# Patient Record
Sex: FEMALE | Race: BLACK OR AFRICAN AMERICAN
[De-identification: names, ages, dates, MRNs, and addresses within clinical notes are randomized per-mention and may not be internally consistent; named-entity substitution may affect disease eponyms.]

---

## 2017-05-04 ENCOUNTER — HOSPITAL ENCOUNTER (EMERGENCY)
Dept: HOSPITAL 17 - NEPD | Age: 27
Discharge: HOME | End: 2017-05-04
Payer: COMMERCIAL

## 2017-05-04 VITALS
DIASTOLIC BLOOD PRESSURE: 85 MMHG | HEART RATE: 98 BPM | OXYGEN SATURATION: 97 % | SYSTOLIC BLOOD PRESSURE: 142 MMHG | RESPIRATION RATE: 14 BRPM | TEMPERATURE: 98.7 F

## 2017-05-04 VITALS — BODY MASS INDEX: 24.99 KG/M2 | HEIGHT: 58 IN | WEIGHT: 119.05 LBS

## 2017-05-04 DIAGNOSIS — N76.0: Primary | ICD-10-CM

## 2017-05-04 DIAGNOSIS — N30.00: ICD-10-CM

## 2017-05-04 DIAGNOSIS — B96.89: ICD-10-CM

## 2017-05-04 LAB
ALP SERPL-CCNC: 66 U/L (ref 45–117)
ALT SERPL-CCNC: 15 U/L (ref 10–53)
ANION GAP SERPL CALC-SCNC: 8 MEQ/L (ref 5–15)
AST SERPL-CCNC: 13 U/L (ref 15–37)
BACTERIA #/AREA URNS HPF: (no result) /HPF
BASOPHILS # BLD AUTO: 0 TH/MM3 (ref 0–0.2)
BASOPHILS NFR BLD: 0.3 % (ref 0–2)
BETA HCG QUANT: 939 MIU/ML (ref 0–5)
BILIRUB SERPL-MCNC: 0.3 MG/DL (ref 0.2–1)
BUN SERPL-MCNC: 11 MG/DL (ref 7–18)
CHLAMYDIA PCR: NOT DETECTED
CHLORIDE SERPL-SCNC: 105 MEQ/L (ref 98–107)
COLOR UR: YELLOW
COMMENT (UR): (no result)
CULTURE IF INDICATED: (no result)
EOSINOPHIL # BLD: 0.1 TH/MM3 (ref 0–0.4)
EOSINOPHIL NFR BLD: 1.1 % (ref 0–4)
ERYTHROCYTE [DISTWIDTH] IN BLOOD BY AUTOMATED COUNT: 14 % (ref 11.6–17.2)
GFR SERPLBLD BASED ON 1.73 SQ M-ARVRAT: 113 ML/MIN (ref 89–?)
GLUCOSE UR STRIP-MCNC: (no result) MG/DL
HCO3 BLD-SCNC: 24.9 MEQ/L (ref 21–32)
HCT VFR BLD CALC: 34.7 % (ref 35–46)
HEMO FLAGS: (no result)
HGB UR QL STRIP: (no result)
KETONES UR STRIP-MCNC: (no result) MG/DL
LYMPHOCYTES # BLD AUTO: 1.7 TH/MM3 (ref 1–4.8)
LYMPHOCYTES NFR BLD AUTO: 25.6 % (ref 9–44)
MCH RBC QN AUTO: 28.5 PG (ref 27–34)
MCHC RBC AUTO-ENTMCNC: 32.6 % (ref 32–36)
MCV RBC AUTO: 87.2 FL (ref 80–100)
MONOCYTES NFR BLD: 7.1 % (ref 0–8)
MUCOUS THREADS #/AREA URNS LPF: (no result) /LPF
NEISSERIA PCR: NOT DETECTED
NEUTROPHILS # BLD AUTO: 4.5 TH/MM3 (ref 1.8–7.7)
NEUTROPHILS NFR BLD AUTO: 65.9 % (ref 16–70)
NITRITE UR QL STRIP: (no result)
PLATELET # BLD: 292 TH/MM3 (ref 150–450)
POTASSIUM SERPL-SCNC: 3.8 MEQ/L (ref 3.5–5.1)
RBC # BLD AUTO: 3.98 MIL/MM3 (ref 4–5.3)
SODIUM SERPL-SCNC: 138 MEQ/L (ref 136–145)
SP GR UR STRIP: 1.03 (ref 1–1.03)
SQUAMOUS #/AREA URNS HPF: 2 /HPF (ref 0–5)
WBC # BLD AUTO: 6.8 TH/MM3 (ref 4–11)

## 2017-05-04 PROCEDURE — 87086 URINE CULTURE/COLONY COUNT: CPT

## 2017-05-04 PROCEDURE — 87210 SMEAR WET MOUNT SALINE/INK: CPT

## 2017-05-04 PROCEDURE — 84702 CHORIONIC GONADOTROPIN TEST: CPT

## 2017-05-04 PROCEDURE — 76817 TRANSVAGINAL US OBSTETRIC: CPT

## 2017-05-04 PROCEDURE — 87491 CHLMYD TRACH DNA AMP PROBE: CPT

## 2017-05-04 PROCEDURE — 96361 HYDRATE IV INFUSION ADD-ON: CPT

## 2017-05-04 PROCEDURE — 81001 URINALYSIS AUTO W/SCOPE: CPT

## 2017-05-04 PROCEDURE — 80053 COMPREHEN METABOLIC PANEL: CPT

## 2017-05-04 PROCEDURE — 96360 HYDRATION IV INFUSION INIT: CPT

## 2017-05-04 PROCEDURE — 99284 EMERGENCY DEPT VISIT MOD MDM: CPT

## 2017-05-04 PROCEDURE — 85025 COMPLETE CBC W/AUTO DIFF WBC: CPT

## 2017-05-04 PROCEDURE — 87591 N.GONORRHOEAE DNA AMP PROB: CPT

## 2017-05-04 PROCEDURE — 76700 US EXAM ABDOM COMPLETE: CPT

## 2017-05-04 PROCEDURE — 84703 CHORIONIC GONADOTROPIN ASSAY: CPT

## 2017-05-04 NOTE — RADRPT
EXAM DATE/TIME:  2017 13:25 

 

[facility] COMPARISON:     

No previous studies available for comparison.

        

 

 

INDICATIONS :     

Pelvic pains and vaginal bleeding. 

                     

 

LAB(S):     

 

Beta-hC

                     

 

MEDICAL HISTORY :     

Hypertension.     Pelvic pains. 

 

SURGICAL HISTORY :     

 section.     Breast cyst removal. 

 

ENCOUNTER:     

Initial

 

ACUITY:     

2 weeks

 

PAIN SCORE:     

0/10

 

LOCATION:     

Bilateral pelvis 

                     

MEASUREMENTS:     

 

UTERUS:                                  

7.9 x 6.0  x 4.2  cm

 

ENDOMETRIAL STRIPE:      

6 mm

 

RIGHT OVARY:                      

2.2 x 2.1  x 1.7 cm

 

LEFT OVARY:                         

2.7 x 2.1 x 1.3  cm

 

FINDINGS:     

 

UTERUS:     

The myometrium has homogeneous echotexture without mass.  There is a  section scar at the ant
erior lower uterine segment. The endometrium is very heterogeneous in echotexture and measures 6 mm i
n thickness. There is a cystic area within the endometrium measuring 6 x 3 mm.

 

RIGHT OVARY:     

Ovary contains no mass or significant cystic lesion.  Follicles are present.

 

LEFT OVARY:     

Ovary contains no mass or significant cystic lesion.  Follicles are present.

 

MISCELLANEOUS:     

No free fluid.  

 

CONCLUSION:     

1. There are no findings to indicate a definite intrauterine pregnancy. A nonspecific 6 mm cystic str
ucture is visualized within the endometrial cavity. Based on history provided the patient reports los
s of pregnancy 2 weeks ago with continued bleeding. I do not see any findings to definitively indicat
e retained products of conception. Suggest clinical followup and followup beta-hCG to a normal level.
 Consider ultrasound followup, as needed.

2. There is no free fluid in the pelvis and ovaries have a normal appearance.

 

 

 

 Jose Luis Thompson MD on May 04, 2017 at 14:14           

Board Certified Radiologist.

 This report was verified electronically.

## 2017-05-04 NOTE — PD
HPI


Chief Complaint:  Gyn Problem/Complaint


Time Seen by Provider:  09:50


Travel History


International Travel<30 days:  No


Contact w/Intl Traveler<30days:  No


Traveled to known affect area:  No





History of Present Illness


HPI


Patient is a 26 year old female who comes in because she is having a foul 

smelling, blood discharge.  She says that she had a positive pregnancy test a 

few weeks ago, LMP in February.  She says that a week ago she had an episode of 

heavy vaginal bleeding and passed several clots.  She says she thought she was 

having a miscarriage at the time. She says since then she has had a sharp right 

sided pain and this foul smelling discharge.  She did not think anything of it 

until she looked it up on the internet and became concerned about retained 

products. She denies fever or chills.  She says she occasionally feels 

lightheaded.  She denies chest pain or SOB.





PFSH


Past Medical History


Diminished Hearing:  No


Hypertension:  Yes (DURING PREGNANCY)


Immunizations Current:  Yes


Tetanus Vaccination:  < 5 Years


Influenza Vaccination:  No


Pregnant?:  Pregnant


LMP:  17


:  2


Para:  1


Miscarriage:  1


:  1





Past Surgical History


 Section:  Yes


Other Surgery:  Yes (REMOVAL BREAST CYST)





Social History


Alcohol Use:  No


Tobacco Use:  No


Substance Use:  No





Allergies-Medications


(Allergen,Severity, Reaction):  


Coded Allergies:  


     No Known Allergies (Verified , 10/9/15)


Reported Meds & Prescriptions





Reported Meds & Active Scripts


Active








Review of Systems


Except as stated in HPI:  all other systems reviewed are Neg


General / Constitutional:  No: Fever, Chills


HENT:  No: Headaches, Lightheadedness


Cardiovascular:  No: Chest Pain or Discomfort


Respiratory:  No: Shortness of Breath


Gastrointestinal:  Positive: Abdominal Pain,  No: Nausea, Vomiting


Genitourinary:  Positive: Discharge,  No: Dysuria


Musculoskeletal:  No: Myalgias, Arthralgias


Skin:  No Rash, No Change in Pigmentation


Neurologic:  No: Weakness, Dizziness





Physical Exam


Narrative


GENERAL: Awake and alert, in no acute distress.


SKIN: Focused skin assessment warm/dry.


HEAD: Atraumatic. Normocephalic. 


EYES: Pupils equal and round. No scleral icterus. 


ENT: Mucous membranes pink and moist.


NECK: Trachea midline. No JVD. 


CARDIOVASCULAR: Regular rate and rhythm.  No murmur appreciated.


RESPIRATORY: No accessory muscle use. Clear to auscultation. Breath sounds 

equal bilaterally. 


GASTROINTESTINAL: Abdomen soft, non-tender, nondistended.


: performed in the presence of female nurse, Stephania.  Malodorous yellowish 

discharge. No CMT. 


MUSCULOSKELETAL: No obvious deformities. No clubbing.  No cyanosis.  No edema. 


NEUROLOGICAL: Awake and alert. No obvious cranial nerve deficits.  Motor 

grossly within normal limits. Normal speech.


PSYCHIATRIC: Appropriate mood and affect; insight and judgment normal.





Data


Data


Last Documented VS





Vital Signs








  Date Time  Temp Pulse Resp B/P Pulse Ox O2 Delivery O2 Flow Rate FiO2


 


17 09:31 98.7 98 14 142/85 97   








Orders





 Complete Blood Count With Diff (17 09:55)


Comprehensive Metabolic Panel (17 09:55)


Beta Hcg (Quant/Titer) (17 09:55)


Urinalysis - C+S If Indicated (17 09:55)


Ed Urine Pregnancytest Poc (17 09:55)


Sodium Chlor 0.9% 1000 Ml Inj (Ns 1000 M (17 10:00)


Acetaminophen (Tylenol) (17 10:00)


Wet Prep Profile (17 10:12)


Gc And Chlamydia Pcr (17 10:12)


Urine Culture (17 10:11)


Us Pelvis (Ques Pr/Ect)W Trans (17 )





Labs





 Laboratory Tests








Test 17





 10:11 10:15


 


Urine Color YELLOW  


 


Urine Turbidity CLEAR  


 


Urine pH 6.5  


 


Urine Specific Gravity 1.026  


 


Urine Protein TRACE mg/dL 


 


Urine Glucose (UA) NEG mg/dL 


 


Urine Ketones NEG mg/dL 


 


Urine Occult Blood MOD  


 


Urine Nitrite NEG  


 


Urine Bilirubin NEG  


 


Urine Urobilinogen LESS THAN 2.0 





 MG/DL 


 


Urine Leukocyte Esterase LARGE  


 


Urine RBC 1 /hpf 


 


Urine WBC 22 /hpf 


 


Urine WBC Clumps RARE  


 


Urine Squamous Epithelial 2 /hpf 





Cells  


 


Urine Bacteria FEW /hpf 


 


Urine Mucus FEW /lpf 


 


Microscopic Urinalysis Comment CULTURE 





 INDICATED 


 


White Blood Count  6.8 TH/MM3


 


Red Blood Count  3.98 MIL/MM3


 


Hemoglobin  11.3 GM/DL


 


Hematocrit  34.7 %


 


Mean Corpuscular Volume  87.2 FL


 


Mean Corpuscular Hemoglobin  28.5 PG


 


Mean Corpuscular Hemoglobin  32.6 %





Concent  


 


Red Cell Distribution Width  14.0 %


 


Platelet Count  292 TH/MM3


 


Mean Platelet Volume  8.1 FL


 


Neutrophils (%) (Auto)  65.9 %


 


Lymphocytes (%) (Auto)  25.6 %


 


Monocytes (%) (Auto)  7.1 %


 


Eosinophils (%) (Auto)  1.1 %


 


Basophils (%) (Auto)  0.3 %


 


Neutrophils # (Auto)  4.5 TH/MM3


 


Lymphocytes # (Auto)  1.7 TH/MM3


 


Monocytes # (Auto)  0.5 TH/MM3


 


Eosinophils # (Auto)  0.1 TH/MM3


 


Basophils # (Auto)  0.0 TH/MM3


 


CBC Comment  DIFF FINAL 


 


Differential Comment   


 


Clue Cells (Wet Prep)  PRESENT 


 


Vaginal Trichomonas (Wet Prep)  NONE SEEN 


 


Vaginal Yeast (Wet Prep)  NONE SEEN 


 


Sodium Level  138 MEQ/L


 


Potassium Level  3.8 MEQ/L


 


Chloride Level  105 MEQ/L


 


Carbon Dioxide Level  24.9 MEQ/L


 


Anion Gap  8 MEQ/L


 


Blood Urea Nitrogen  11 MG/DL


 


Creatinine  0.75 MG/DL


 


Estimat Glomerular Filtration  113 ML/MIN





Rate  


 


Random Glucose  99 MG/DL


 


Calcium Level  8.9 MG/DL


 


Total Bilirubin  0.3 MG/DL


 


Aspartate Amino Transf  13 U/L





(AST/SGOT)  


 


Alanine Aminotransferase  15 U/L





(ALT/SGPT)  


 


Alkaline Phosphatase  66 U/L


 


Total Protein  7.3 GM/DL


 


Albumin  3.4 GM/DL


 


Human Chorionic Gonadotropin,  939 MIU/ML





Quant  


 


Chlamydia trachomatis DNA  NOT DETECTED 





(PCR)  


 


Neisseria gonorrhoeae DNA  NOT DETECTED 





(PCR)  











MDM


Medical Decision Making


Medical Screen Exam Complete:  Yes


Emergency Medical Condition:  Yes


Medical Record Reviewed:  Yes


Differential Diagnosis


retained products vs ectopic pregnancy vs endometritis


Narrative Course


Patient is a 26 year old female who comes in complaining of abdominal pain and 

vaginal discharge.  Exam shows malodorous discharge, no CMT.  IV established, 

labs sent.  Labs show clue cells in her wet prep and WBCs in her urine.  





US performed shows heterogenous uterus, no IUP or retained products.  





Beta HCG is 939 today.  Patient advised to have testing repeated in 2 days.  

Advised to follow up with gyn.  Advised to take all of her antibiotics and 

avoid alcohol use while taking the antibiotics.  Advised to return to the ED as 

needed for any worsening symptoms.





Diagnosis





 Primary Impression:  


 Bacterial vaginosis


 Additional Impression:  


 UTI (urinary tract infection)


 Qualified Code:  N30.00 - Acute cystitis without hematuria


Patient Instructions:  Bacterial Vaginosis (ED), General Instructions





***Additional Instructions:


Return in 2 days for repeat testing.  Take all of your antibiotics.  Avoid 

alcohol while taking these antibiotics as it will make you very sick.  Return 

to the ED as needed for any worsening symptoms.


Scripts


Nitrofurantoin Monohydrate Macrocrystals (Macrobid)100 Mg [id number] Mg PO BID  5 

Days  Ref 0


   Prov:Kimberly Garcia MD         17 


Metronidazole (Flagyl)500 Mg [id number] Mg PO BID  7 Days  Ref 0


   Prov:Kimberly Garcia MD         17


Disposition:  01 DISCHARGE HOME


Condition:  Stable








Kimberly Garcia MD May 4, 2017 11:28

## 2018-06-09 ENCOUNTER — HOSPITAL ENCOUNTER (EMERGENCY)
Dept: HOSPITAL 17 - NEPE | Age: 28
Discharge: HOME | End: 2018-06-09
Payer: SELF-PAY

## 2018-06-09 VITALS
DIASTOLIC BLOOD PRESSURE: 81 MMHG | OXYGEN SATURATION: 100 % | RESPIRATION RATE: 18 BRPM | TEMPERATURE: 99 F | SYSTOLIC BLOOD PRESSURE: 121 MMHG | HEART RATE: 96 BPM

## 2018-06-09 VITALS
TEMPERATURE: 98.3 F | OXYGEN SATURATION: 100 % | HEART RATE: 109 BPM | RESPIRATION RATE: 18 BRPM | SYSTOLIC BLOOD PRESSURE: 138 MMHG | DIASTOLIC BLOOD PRESSURE: 99 MMHG

## 2018-06-09 VITALS — HEIGHT: 59 IN | BODY MASS INDEX: 24.89 KG/M2 | WEIGHT: 123.46 LBS

## 2018-06-09 VITALS — SYSTOLIC BLOOD PRESSURE: 119 MMHG | HEART RATE: 83 BPM | DIASTOLIC BLOOD PRESSURE: 81 MMHG

## 2018-06-09 VITALS — OXYGEN SATURATION: 100 % | RESPIRATION RATE: 18 BRPM

## 2018-06-09 DIAGNOSIS — R07.89: Primary | ICD-10-CM

## 2018-06-09 DIAGNOSIS — R51: ICD-10-CM

## 2018-06-09 DIAGNOSIS — R00.0: ICD-10-CM

## 2018-06-09 DIAGNOSIS — R94.31: ICD-10-CM

## 2018-06-09 DIAGNOSIS — K21.9: ICD-10-CM

## 2018-06-09 LAB
ALBUMIN SERPL-MCNC: 3.8 GM/DL (ref 3.4–5)
ALP SERPL-CCNC: 57 U/L (ref 45–117)
ALT SERPL-CCNC: 19 U/L (ref 10–53)
AST SERPL-CCNC: 14 U/L (ref 15–37)
BASOPHILS # BLD AUTO: 0 TH/MM3 (ref 0–0.2)
BASOPHILS NFR BLD: 0.6 % (ref 0–2)
BILIRUB SERPL-MCNC: 0.3 MG/DL (ref 0.2–1)
BUN SERPL-MCNC: 13 MG/DL (ref 7–18)
CALCIUM SERPL-MCNC: 8.6 MG/DL (ref 8.5–10.1)
CHLORIDE SERPL-SCNC: 105 MEQ/L (ref 98–107)
CREAT SERPL-MCNC: 0.99 MG/DL (ref 0.5–1)
D-DIMER: (no result) MG/L FEU (ref 0–0.5)
EOSINOPHIL # BLD: 0.1 TH/MM3 (ref 0–0.4)
EOSINOPHIL NFR BLD: 1.7 % (ref 0–4)
ERYTHROCYTE [DISTWIDTH] IN BLOOD BY AUTOMATED COUNT: 13.5 % (ref 11.6–17.2)
GFR SERPLBLD BASED ON 1.73 SQ M-ARVRAT: 81 ML/MIN (ref 89–?)
GLUCOSE SERPL-MCNC: 90 MG/DL (ref 74–106)
HCO3 BLD-SCNC: 26.1 MEQ/L (ref 21–32)
HCT VFR BLD CALC: 35.5 % (ref 35–46)
HGB BLD-MCNC: 12 GM/DL (ref 11.6–15.3)
INR PPP: 1 RATIO
LYMPHOCYTES # BLD AUTO: 2.2 TH/MM3 (ref 1–4.8)
LYMPHOCYTES NFR BLD AUTO: 36 % (ref 9–44)
MAGNESIUM SERPL-MCNC: 1.9 MG/DL (ref 1.5–2.5)
MCH RBC QN AUTO: 29.7 PG (ref 27–34)
MCHC RBC AUTO-ENTMCNC: 33.7 % (ref 32–36)
MCV RBC AUTO: 88 FL (ref 80–100)
MONOCYTE #: 0.5 TH/MM3 (ref 0–0.9)
MONOCYTES NFR BLD: 8.8 % (ref 0–8)
NEUTROPHILS # BLD AUTO: 3.3 TH/MM3 (ref 1.8–7.7)
NEUTROPHILS NFR BLD AUTO: 52.9 % (ref 16–70)
PLATELET # BLD: 274 TH/MM3 (ref 150–450)
PMV BLD AUTO: 8.5 FL (ref 7–11)
PROT SERPL-MCNC: 7.7 GM/DL (ref 6.4–8.2)
PROTHROMBIN TIME: 10.5 SEC (ref 9.8–11.6)
RBC # BLD AUTO: 4.03 MIL/MM3 (ref 4–5.3)
SODIUM SERPL-SCNC: 139 MEQ/L (ref 136–145)
TROPONIN I SERPL-MCNC: (no result) NG/ML (ref 0.02–0.05)
WBC # BLD AUTO: 6.2 TH/MM3 (ref 4–11)

## 2018-06-09 PROCEDURE — 83735 ASSAY OF MAGNESIUM: CPT

## 2018-06-09 PROCEDURE — 82552 ASSAY OF CPK IN BLOOD: CPT

## 2018-06-09 PROCEDURE — 99285 EMERGENCY DEPT VISIT HI MDM: CPT

## 2018-06-09 PROCEDURE — 80053 COMPREHEN METABOLIC PANEL: CPT

## 2018-06-09 PROCEDURE — 82550 ASSAY OF CK (CPK): CPT

## 2018-06-09 PROCEDURE — 83880 ASSAY OF NATRIURETIC PEPTIDE: CPT

## 2018-06-09 PROCEDURE — 85610 PROTHROMBIN TIME: CPT

## 2018-06-09 PROCEDURE — 85025 COMPLETE CBC W/AUTO DIFF WBC: CPT

## 2018-06-09 PROCEDURE — 84484 ASSAY OF TROPONIN QUANT: CPT

## 2018-06-09 PROCEDURE — 85379 FIBRIN DEGRADATION QUANT: CPT

## 2018-06-09 PROCEDURE — 71046 X-RAY EXAM CHEST 2 VIEWS: CPT

## 2018-06-09 PROCEDURE — 85730 THROMBOPLASTIN TIME PARTIAL: CPT

## 2018-06-09 PROCEDURE — 93005 ELECTROCARDIOGRAM TRACING: CPT

## 2018-06-09 PROCEDURE — 83690 ASSAY OF LIPASE: CPT

## 2018-06-09 NOTE — RADRPT
EXAM DATE:  6/9/2018 4:49 PM EDT

AGE/SEX:        27 years / Female



INDICATIONS:  Chest pain.



CLINICAL DATA:  This is the patient's initial encounter. Patient reports that signs and symptoms have
 been present for 4 - 6 days and indicates a pain score of 5/10. 

                                                                          

MEDICAL/SURGICAL HISTORY:       None. None.



COMPARISON:      No prior exams available for comparison. 





FINDINGS:  

PA and lateral views of the chest demonstrate the lungs to be symmetrically aerated without evidence 
of mass, infiltrate or effusion. The cardiomediastinal contours are unremarkable. Osseous structures 
are intact.



CONCLUSION: 

No active disease.



Electronically signed by: Walter Villatoro MD  6/9/2018 4:58 PM EDT

## 2018-06-10 NOTE — EKG
Date Performed: 06/09/2018       Time Performed: 15:10:27

 

PTAGE:      27 years

 

EKG:      SINUS TACHYCARDIA POSSIBLE LEFT ATRIAL ENLARGEMENT POSSIBLE RIGHT VENTRICULAR CONDUCTION DE
LAY ABNORMAL RHYTHM ECG INTERPRETATION BASED ON A DEFAULT AGE OF 40 YEARS 

 

NO PREVIOUS TRACING            

 

DOCTOR:   Robbin Rios  Interpretating Date/Time  06/10/2018 14:40:34